# Patient Record
Sex: FEMALE | URBAN - METROPOLITAN AREA
[De-identification: names, ages, dates, MRNs, and addresses within clinical notes are randomized per-mention and may not be internally consistent; named-entity substitution may affect disease eponyms.]

---

## 2018-01-04 NOTE — PROCEDURE NOTE: CLINICAL
PROCEDURE NOTE: Eylea #10 OD. Diagnosis: Neovascular AMD with Active CNV. Prep: Betadine Drops and Betadine Scrub. Prior to injection, risks/benefits/alternatives discussed including corneal abrasion, infection, loss of vision, hemorrhage, cataract, glaucoma, retinal tears or detachment. A written consent is on file, and the need for today’s injection was discussed and the patient is understanding and wishes to proceed. The entire vial of Eylea was drawn up into a syringe. The opened vial, remaining drug, and filtered needle were disposed of in a certified biohazard container. Betadine prep was performed. Topical anesthesia was induced with Alcaine. 4% lidocaine pledge. A lid speculum was used. A short 30g needle on a 1cc syringe was used with product that that had previously been prepared under sterile conditions. Injection site: 3-4 mm from the limbus. The used syringe/needle was transferred to a biohazard container. Lid speculum removed. Mask worn during procedure. Patient tolerated procedure well. Count fingers vision was verified. There were no complications. Patient was given the standard instruction sheet. Patient given office phone number/answering service number and advised to call immediately should there be loss of vision or pain, or should they have any other questions or concerns. Michael Miller

## 2018-01-04 NOTE — PATIENT DISCUSSION
Based on the OCT findings, I recommend a “treat and extend” protocol, and we will extend the injection interval to 11 weeks.

## 2018-01-04 NOTE — PATIENT DISCUSSION
Increased risk for malignant transformation discussed with patient. Will refer to an ocular oncologist for evaluation and possible treatment.

## 2018-01-04 NOTE — PATIENT DISCUSSION
Advised patient that the lesion is atypical and will need to be closely monitored for changes that would signal possible malignancy.

## 2018-03-27 NOTE — PATIENT DISCUSSION
No new SRF/IRF noted on testing today.  Patient elects to defer treatment at this time.  Will observe.

## 2020-08-10 ENCOUNTER — IMPORTED ENCOUNTER (OUTPATIENT)
Dept: URBAN - METROPOLITAN AREA CLINIC 9 | Facility: CLINIC | Age: 65
End: 2020-08-10

## 2020-08-26 ENCOUNTER — IMPORTED ENCOUNTER (OUTPATIENT)
Dept: URBAN - METROPOLITAN AREA CLINIC 9 | Facility: CLINIC | Age: 65
End: 2020-08-26

## 2020-09-01 ENCOUNTER — IMPORTED ENCOUNTER (OUTPATIENT)
Dept: URBAN - METROPOLITAN AREA CLINIC 9 | Facility: CLINIC | Age: 65
End: 2020-09-01

## 2020-09-23 ENCOUNTER — IMPORTED ENCOUNTER (OUTPATIENT)
Dept: URBAN - METROPOLITAN AREA CLINIC 9 | Facility: CLINIC | Age: 65
End: 2020-09-23

## 2020-09-23 PROBLEM — Z98.890: Noted: 2020-08-26

## 2020-09-23 PROBLEM — Z98.890: Noted: 2020-09-23

## 2020-09-29 ENCOUNTER — IMPORTED ENCOUNTER (OUTPATIENT)
Dept: URBAN - METROPOLITAN AREA CLINIC 9 | Facility: CLINIC | Age: 65
End: 2020-09-29

## 2020-12-15 NOTE — PROCEDURE NOTE: CLINICAL
PROCEDURE NOTE: Lucentis 0.5 mg OD. Diagnosis: Neovascular AMD with Active CNV. Anesthesia: Topical. Prep: Betadine Drops and Scrubs. Prior to injection, risks/benefits/alternatives discussed including infection, loss of vision, hemorrhage, cataract, glaucoma, retinal tears or detachment and patient wished to proceed. Informed consent obtained. . Patient was advised the purpose of the treatment was to slow the progression of the disease, and may not improve visual acuity. Betadine prep was performed. Injection site: 3-4 mm from the limbus. Mask worn during procedure. A lid speculum was used. Intravitreal injection of Lucentis 0.5mg/0.05 ml was given. Discarded remaining *. CRA perfusion confirmed. The eye was irrigated with sterile eye rinse solution. The betadine was washed away. Count fingers vision was verified. The patient tolerated the procedure well and there were no complications from the procedure. Post procedure instructions given. Patient given office phone number/answering service number and advised to call immediately should there be an increase in floaters or redness, loss of vision or pain, or should they have any other questions or concerns. Patient was given the standard instruction sheet. Aramis Durbin

## 2021-01-12 NOTE — PROCEDURE NOTE: CLINICAL
PROCEDURE NOTE: Lucentis 0.5 mg OD. Diagnosis: Neovascular AMD with Active CNV. Anesthesia: Topical. Prep: Betadine Drops and Scrubs. Prior to injection, risks/benefits/alternatives discussed including infection, loss of vision, hemorrhage, cataract, glaucoma, retinal tears or detachment and patient wished to proceed. Informed consent obtained. . Patient was advised the purpose of the treatment was to slow the progression of the disease, and may not improve visual acuity. Betadine prep was performed. Injection site: 3-4 mm from the limbus. Mask worn during procedure. A lid speculum was used. Intravitreal injection of Lucentis 0.5mg/0.05 ml was given. Discarded remaining *. CRA perfusion confirmed. The eye was irrigated with sterile eye rinse solution. The betadine was washed away. Count fingers vision was verified. The patient tolerated the procedure well and there were no complications from the procedure. Post procedure instructions given. Patient given office phone number/answering service number and advised to call immediately should there be an increase in floaters or redness, loss of vision or pain, or should they have any other questions or concerns. Patient was given the standard instruction sheet. Michelle Schuster

## 2021-02-09 NOTE — PROCEDURE NOTE: CLINICAL
PROCEDURE NOTE: Lucentis 0.5 mg OD. Diagnosis: Neovascular AMD with Active CNV. Anesthesia: Topical. Prep: Betadine Drops and Scrubs. Prior to injection, risks/benefits/alternatives discussed including infection, loss of vision, hemorrhage, cataract, glaucoma, retinal tears or detachment and patient wished to proceed. Informed consent obtained. . Patient was advised the purpose of the treatment was to slow the progression of the disease, and may not improve visual acuity. Betadine prep was performed. Injection site: 3-4 mm from the limbus. Mask worn during procedure. A lid speculum was used. Intravitreal injection of Lucentis 0.5mg/0.05 ml was given. Discarded remaining *. CRA perfusion confirmed. The eye was irrigated with sterile eye rinse solution. The betadine was washed away. Count fingers vision was verified. The patient tolerated the procedure well and there were no complications from the procedure. Post procedure instructions given. Patient given office phone number/answering service number and advised to call immediately should there be an increase in floaters or redness, loss of vision or pain, or should they have any other questions or concerns. Patient was given the standard instruction sheet. Nereyda Mtz

## 2021-07-27 NOTE — PATIENT DISCUSSION
Slightly increased VMT, No RT/RD.  Recommended against surgery for now given good vision and lack of impact on activities of daily living.

## 2021-10-18 ASSESSMENT — KERATOMETRY
OS_AXISANGLE2_DEGREES: 119
OD_AXISANGLE2_DEGREES: 75
OD_K1POWER_DIOPTERS: 46.5
OD_K1POWER_DIOPTERS: 46.25
OS_AXISANGLE_DEGREES: 3
OD_AXISANGLE2_DEGREES: 59
OD_AXISANGLE_DEGREES: 165
OS_K2POWER_DIOPTERS: 47
OD_AXISANGLE_DEGREES: 149
OD_AXISANGLE2_DEGREES: 95
OS_K2POWER_DIOPTERS: 47.25
OD_K2POWER_DIOPTERS: 47.25
OD_K1POWER_DIOPTERS: 46.25
OS_AXISANGLE2_DEGREES: 93
OS_AXISANGLE_DEGREES: 29
OS_K1POWER_DIOPTERS: 46.75
OD_K2POWER_DIOPTERS: 46.75
OS_K1POWER_DIOPTERS: 46.75
OD_AXISANGLE_DEGREES: 5
OD_K2POWER_DIOPTERS: 46.75

## 2021-10-18 ASSESSMENT — VISUAL ACUITY
OD_SC: 20/40 SN
OD_SC: 20/50 SN
OS_SC: 20/40 SN
OS_CC: 20/25 SN
OS_CC: 20/25 + SN
OD_SC: 20/30 SN
OD_CC: 20/25 SN
OD_CC: 20/40 SN
OD_CC: 20/25 - SN
OS_SC: 20/25 SN

## 2021-10-18 ASSESSMENT — TONOMETRY
OD_IOP_MMHG: 9
OD_IOP_MMHG: 12
OD_IOP_MMHG: 12
OS_IOP_MMHG: 10
OS_IOP_MMHG: 11

## 2022-03-08 NOTE — PATIENT DISCUSSION
Stable VMT, No progression. [Time Spent: ___ minutes] : I have spent [unfilled] minutes of time on the encounter. [>50% of the face to face encounter time was spent on counseling and/or coordination of care for ___] : Greater than 50% of the face to face encounter time was spent on counseling and/or coordination of care for [unfilled]

## 2022-06-22 ENCOUNTER — ESTABLISHED PATIENT (OUTPATIENT)
Dept: URBAN - NONMETROPOLITAN AREA CLINIC 6 | Facility: CLINIC | Age: 67
End: 2022-06-22

## 2022-06-22 DIAGNOSIS — Z96.1: ICD-10-CM

## 2022-06-22 DIAGNOSIS — H04.123: ICD-10-CM

## 2022-06-22 DIAGNOSIS — H35.372: ICD-10-CM

## 2022-06-22 DIAGNOSIS — H10.13: ICD-10-CM

## 2022-06-22 DIAGNOSIS — H43.811: ICD-10-CM

## 2022-06-22 PROCEDURE — 92014 COMPRE OPH EXAM EST PT 1/>: CPT

## 2022-06-22 PROCEDURE — 92134 CPTRZ OPH DX IMG PST SGM RTA: CPT

## 2022-06-22 ASSESSMENT — KERATOMETRY
OS_K2POWER_DIOPTERS: 47.25
OD_K2POWER_DIOPTERS: 46.75
OS_AXISANGLE2_DEGREES: 136
OD_K1POWER_DIOPTERS: 46.50
OD_AXISANGLE2_DEGREES: 94
OD_AXISANGLE_DEGREES: 4
OS_K1POWER_DIOPTERS: 46.75
OS_AXISANGLE_DEGREES: 46

## 2022-06-22 ASSESSMENT — VISUAL ACUITY
OU_SC: 20/20-1
OD_SC: 20/30
OS_SC: 20/25

## 2022-06-22 ASSESSMENT — TONOMETRY
OD_IOP_MMHG: 11
OS_IOP_MMHG: 14